# Patient Record
(demographics unavailable — no encounter records)

---

## 2025-03-14 NOTE — HISTORY OF PRESENT ILLNESS
[FreeTextEntry1] : TANIA CAMACHO Jun 17 1956   Language: English Date of First visit: 03/14/2025 Accompanied by: self Contact info: Referring Provider/PCP: Dr. Umm Rodriguez Fax: [phone]   CC/ Problem List:  Mild right hydronephrosis =============================================================================== FIRST VISIT / Summary: Very pleasant 68 year old F here for right flank pain, started after her right knee surgery in December 2024. She notes the pain is improved after urinating, and also better when she manages constipation well. She takes senna/docusate/milk of magnesium. Currently fasting but usually drinks 2-3L fluids daily. She does not note changes otherwise. Her referral is for hydronephrosis - reviewed imaging in [facility] radiology and no recent renal images seen. Never had hematuria. [facility] does have a urogram from 2020 and US 2022 showing no hydronephrosis. In 12/5/24 mild right hydro is seen, no stones. Small renal cyst stable  She has no hematuria   ------------------------------------------------------------------------------------------- INTERVAL VISITS:   ===============================================================================   PMH:  FHx: unsure if her father had a cancer of  tract. SocHx: never smoker, no etoh, no chemical exposure. Unemployed.   PSH:   ROS: Review of Systems is as per HPI unless otherwise denoted below   =============================================================================== DATA: LABS (SELECTED):---------------------------------------------------------------------------------------------------  12/5/2024: Cr 1.4, UA no blood, microalbuminuria present.    RADS:-------------------------------------------------------------------------------------------------------------------  Multiple US and CT reviewed from 2020 - 2024 in [facility]. Most recent with very mild hydronephrosis on right (US) and bladder 212cc. Earlier imaging no hydro and bladder volume up to 200cc similar volume   PATHOLOGY/CYTOLOGY:-------------------------------------------------------------------------------------------     VOIDING STUDIES: ----------------------------------------------------------------------------------------------------     STONE STUDIES: (Analysis/LLSA)----------------------------------------------------------------------------------     PROCEDURES: -----------------------------------------------------------------------------------------------       =============================================================================== PHYSICAL EXAM:   AYAN FOCUSED: ----------------------------------------------------------------------------------------------------------------     ======================================================================================= DISCUSSION: ======================================================================================= ASSESSMENT and PLAN   1. Right flank pain and hydronephrosis - CT urogram now, advised to void immediately before scan as possible reflux vs overfilling as cause of  - RTC for results   ======================================================================================= The total time personally spent preparing for this visit (reviewing test results, obtaining external history) and during the visit (ordering tests/medications, spent face to face with the patient / family and counseling them on the above), as well as after the visit (on clinical documentation and coordination with other care providers) was approximately 65 minutes in addition to any procedures.   Thank you for allowing me to assist in the care of your patient. Should you have any questions please do not hesitate to reach out to me.     Max Edwards MD                                                       [facility] Physician [facility]   [place] Office: [address], [address] [place] [address] T: [phone] F: [phone]   [place] Office: [address] [place] [address] T: [phone] F: [phone]

## 2025-04-11 NOTE — HISTORY OF PRESENT ILLNESS
[FreeTextEntry1] : TANIA CAMACHO Jun 17 1956  Language: English Date of First visit: 03/14/2025 Accompanied by: self Contact info: Referring Provider/PCP: Dr. Umm Rodriguez Fax: [phone]  CC/ Problem List: Mild right hydronephrosis =============================================================================== FIRST VISIT / Summary: Very pleasant 68 year old F here for right flank pain, started after her right knee surgery in December 2024. She notes the pain is improved after urinating, and also better when she manages constipation well. She takes senna/docusate/milk of magnesium. Currently fasting but usually drinks 2-3L fluids daily. She does not note changes otherwise. Her referral is for hydronephrosis - reviewed imaging in [facility] radiology and no recent renal images seen. Never had hematuria. [facility] does have a urogram from 2020 and US 2022 showing no hydronephrosis. In 12/5/24 mild right hydro is seen, no stones. Small renal cyst stable.  She has no hematuria.  ------------------------------------------------------------------------------------------- INTERVAL VISITS: The patient's medications and allergies were reviewed and edited below. Dated 04/11/2025   here for CT follow-up ===============================================================================  PMH:  FHx: unsure if her father had a cancer of  tract. SocHx: never smoker, no etoh, no chemical exposure. Unemployed.  PSH:  ROS: Review of Systems is as per HPI unless otherwise denoted below  =============================================================================== DATA: LABS (SELECTED):--------------------------------------------------------------------------------------------------- 12/5/2024: Cr 1.4, UA no blood, microalbuminuria present.  RADS:------------------------------------------------------------------------------------------------------------------- Multiple US and CT reviewed from 2020 - 2024 in [facility]. Most recent with very mild hydronephrosis on right (US) and bladder 212cc. Earlier imaging no hydro and bladder volume up to 200cc similar volume  PATHOLOGY/CYTOLOGY:-------------------------------------------------------------------------------------------   VOIDING STUDIES: ----------------------------------------------------------------------------------------------------   STONE STUDIES: (Analysis/LLSA)----------------------------------------------------------------------------------   PROCEDURES: -----------------------------------------------------------------------------------------------    =============================================================================== PHYSICAL EXAM:   FOCUSED: ----------------------------------------------------------------------------------------------------------------   ======================================================================================= DISCUSSION: ======================================================================================= ASSESSMENT and PLAN  1. Right flank pain and hydronephrosis - CT urogram now, advised to void immediately before scan as possible reflux vs overfilling as cause of - RTC for results  ======================================================================================= The total time personally spent preparing for this visit (reviewing test results, obtaining external history) and during the visit (ordering tests/medications, spent face to face with the patient / family and counseling them on the above), as well as after the visit (on clinical documentation and coordination with other care providers) was approximately 65 minutes in addition to any procedures.  Thank you for allowing me to assist in the care of your patient. Should you have any questions please do not hesitate to reach out to me.   Max Edwards MD       [facility] Physician [facility]  [place] Office: [address], [address] [place] [address] T: [phone] F: [phone]  [place] Office: [address] [place] [address] T: [phone] F: [phone]